# Patient Record
Sex: FEMALE | Race: WHITE | NOT HISPANIC OR LATINO | URBAN - METROPOLITAN AREA
[De-identification: names, ages, dates, MRNs, and addresses within clinical notes are randomized per-mention and may not be internally consistent; named-entity substitution may affect disease eponyms.]

---

## 2023-05-28 ENCOUNTER — INPATIENT (INPATIENT)
Facility: HOSPITAL | Age: 33
LOS: 1 days | Discharge: HOME | End: 2023-05-30
Attending: OBSTETRICS & GYNECOLOGY | Admitting: OBSTETRICS & GYNECOLOGY
Payer: COMMERCIAL

## 2023-05-28 VITALS
HEIGHT: 65 IN | SYSTOLIC BLOOD PRESSURE: 129 MMHG | WEIGHT: 210.1 LBS | TEMPERATURE: 97 F | HEART RATE: 71 BPM | RESPIRATION RATE: 14 BRPM | DIASTOLIC BLOOD PRESSURE: 82 MMHG

## 2023-05-28 DIAGNOSIS — Z98.891 HISTORY OF UTERINE SCAR FROM PREVIOUS SURGERY: Chronic | ICD-10-CM

## 2023-05-28 DIAGNOSIS — Z3A.00 WEEKS OF GESTATION OF PREGNANCY NOT SPECIFIED: ICD-10-CM

## 2023-05-28 DIAGNOSIS — O26.893 OTHER SPECIFIED PREGNANCY RELATED CONDITIONS, THIRD TRIMESTER: ICD-10-CM

## 2023-05-28 DIAGNOSIS — O26.899 OTHER SPECIFIED PREGNANCY RELATED CONDITIONS, UNSPECIFIED TRIMESTER: ICD-10-CM

## 2023-05-28 PROCEDURE — 59050 FETAL MONITOR W/REPORT: CPT

## 2023-05-28 PROCEDURE — 86850 RBC ANTIBODY SCREEN: CPT

## 2023-05-28 PROCEDURE — 85610 PROTHROMBIN TIME: CPT

## 2023-05-28 PROCEDURE — 86703 HIV-1/HIV-2 1 RESULT ANTBDY: CPT

## 2023-05-28 PROCEDURE — 86901 BLOOD TYPING SEROLOGIC RH(D): CPT

## 2023-05-28 PROCEDURE — 85384 FIBRINOGEN ACTIVITY: CPT

## 2023-05-28 PROCEDURE — 36415 COLL VENOUS BLD VENIPUNCTURE: CPT

## 2023-05-28 PROCEDURE — 86900 BLOOD TYPING SEROLOGIC ABO: CPT

## 2023-05-28 PROCEDURE — 86592 SYPHILIS TEST NON-TREP QUAL: CPT

## 2023-05-28 PROCEDURE — 85025 COMPLETE CBC W/AUTO DIFF WBC: CPT

## 2023-05-28 PROCEDURE — 85730 THROMBOPLASTIN TIME PARTIAL: CPT

## 2023-05-28 RX ORDER — SODIUM CHLORIDE 9 MG/ML
1000 INJECTION, SOLUTION INTRAVENOUS
Refills: 0 | Status: DISCONTINUED | OUTPATIENT
Start: 2023-05-28 | End: 2023-05-29

## 2023-05-28 RX ORDER — CITRIC ACID/SODIUM CITRATE 300-500 MG
15 SOLUTION, ORAL ORAL EVERY 6 HOURS
Refills: 0 | Status: DISCONTINUED | OUTPATIENT
Start: 2023-05-28 | End: 2023-05-29

## 2023-05-28 RX ORDER — OXYTOCIN 10 UNIT/ML
333.33 VIAL (ML) INJECTION
Qty: 20 | Refills: 0 | Status: DISCONTINUED | OUTPATIENT
Start: 2023-05-28 | End: 2023-05-29

## 2023-05-28 NOTE — OB PROVIDER H&P - NSHPPHYSICALEXAM_GEN_ALL_CORE
Vital Signs Last 24 Hrs  T(C): 36.3 (28 May 2023 23:35), Max: 36.3 (28 May 2023 23:35)  T(F): 97.4 (28 May 2023 23:35), Max: 97.4 (28 May 2023 23:35)  HR: 71 (28 May 2023 23:35) (71 - 71)  BP: 129/82 (28 May 2023 23:35) (129/82 - 129/82)  RR: 14 (28 May 2023 23:35) (14 - 14)    Gen: NAD, sitting comfortably  Abd: Gravid, soft, NT, moderatly palpable ctx, no incisional tenderness  SVE: 2/80/-2, vtx, intact  EFM: 145/mod/+accels  [place]: Q2-3m Vital Signs Last 24 Hrs  T(C): 36.3 (28 May 2023 23:35), Max: 36.3 (28 May 2023 23:35)  T(F): 97.4 (28 May 2023 23:35), Max: 97.4 (28 May 2023 23:35)  HR: 71 (28 May 2023 23:35) (71 - 71)  BP: 129/82 (28 May 2023 23:35) (129/82 - 129/82)  RR: 14 (28 May 2023 23:35) (14 - 14)    Gen: NAD, sitting comfortably  Abd: Gravid, soft, NT, moderatly palpable ctx, no incisional tenderness  SVE: 2/80/-2, vtx, intact  EFM: 145/mod/+accels/+ decel  [place]: Q2-3m

## 2023-05-28 NOTE — OB PROVIDER H&P - HISTORY OF PRESENT ILLNESS
32 yo  @39w1d, MELL 6/3/23, presents for contractions. Pt reports strong contractions that started this evening, now q2-3m, 10/10 intensity. Reports +FM, denies VB. Unsure of LOF. Pregnancy complicated by h/o DVT in prior pregnancy at 20 weeks. This pregnancy, on lovenox, transitioned to Heparin 5k BID. Last dose 8am. H/o LTCS due to arrest at 7cm, 8-11lb . Desires TOLAC. GBS negative per PMD.  32 yo  @39w1d, MELL 6/3/23, presents for contractions. Pt reports strong contractions that started this evening, now q2-3m, 10/10 intensity. Reports +FM, denies VB. Unsure of LOF. Pregnancy complicated by h/o DVT in prior pregnancy at 20 weeks. This pregnancy, on lovenox, transitioned to Heparin 5k BID. Last dose 8am. H/o LTCS due to arrest at 7cm, 8-11lb . Desires TOLAC. GBS negative.

## 2023-05-28 NOTE — OB PROVIDER H&P - NSOBPROC_OBGYN_ALL_OB
9/26/2018         RE: Devyn Link  [address] [address] [facility] [address] [id number]        Dear Colleague,    Thank you for referring your patient, Devyn Link, to the [facility]. Please see a copy of my visit note below.    Name: Devyn Link  Seen in consultation with Prisca Lucero for hyperPTH.  HPI:  Devyn Link is a 75 year old female who presents for the evaluation of hyperPTH.  High calcium has been noted since 2007 on and off.  Other past medical history include CAD, history of aortic valve replacement, hypertension, mitral valve replacement, pacemaker in place, prediabetes, osteoporosis without current pathological fracture.  Was on amiodarone in past and now off X 6 months. Was on it for about 2-3 years.  DEXA 2017 consistent with osteoporosis.  She is not on medication at this time.    History of Cancer:No  Thiazide Diuretic:No  Lithium use:No  Family History of pituitary adenoma, pancreas tumors, Zollinger-Diaz syndrome, pheochromocytoma. No  Kidney stones:No  Fractures: No  Abdominal Pain:Yes (Please explain): sometimes. H/o peptic ulcer. Is followed up by GI.  Cramps: No  Constipation: sometimes  Muscle Aches or pains: No  Muscle twitches: No  Nausea and vomiting: sometimes  Confusion Lethargy and fatigue: better now  ON medications like Lithium/ HCTZ: no  Average Daily Calcium intake: dairy- not much  Ca and Vit D supplementation: no    PMH/PSH:  Past Medical History:   Diagnosis Date     Hypertension      Myocardial infarction      Pacemaker      Past Surgical History:   Procedure Laterality Date     C REPLACEMENT OF MITRAL VALVE  1994    Done at [facility]     COLONOSCOPY  06/13/2018    Dr. Alvarez [facility]     COLONOSCOPY N/A 6/13/2018    Procedure: COLONOSCOPY;;  Surgeon: Rosario Alvarez MD;  Location:  GI     ESOPHAGOSCOPY, GASTROSCOPY, DUODENOSCOPY (EGD), COMBINED  06/13/2018    Dr. Kim KEITH     ESOPHAGOSCOPY, GASTROSCOPY, DUODENOSCOPY (EGD), COMBINED N/A 6/13/2018     "Procedure: COMBINED ESOPHAGOSCOPY, GASTROSCOPY, DUODENOSCOPY (EGD), BIOPSY SINGLE OR MULTIPLE;  COMBINED ESOPHAGOSCOPY, GASTROSCOPY, DUODENOSCOPY (EGD) with biopsies COLONOSCOPY   ;  Surgeon: Rosario Alvarez MD;  Location:  GI     HEART CATH DRUG ELUTING STENT PLACEMENT  2004/2007    seeing Cardiologist at [facility]     SURGICAL HISTORY OF -   ~ 1995    mechanical aortic valve     SURGICAL HISTORY OF -       pacemaker     SURGICAL HISTORY OF -       mechanical mitral valve     Family Hx:  Family History   Problem Relation Age of Onset     Hypertension Mother      Cancer Mother      lung     HEART DISEASE Mother      Cerebrovascular Disease Mother      Hypertension Sister      Colon Cancer No family hx of             Social Hx:  Social History     Social History     Marital status:      Spouse name: N/A     Number of children: N/A     Years of education: N/A     Occupational History     Not on file.     Social History Main Topics     Smoking status: Never Smoker     Smokeless tobacco: Never Used     Alcohol use No     Drug use: No     Sexual activity: Yes     Partners: Male     Other Topics Concern     Parent/Sibling W/ Cabg, Mi Or Angioplasty Before 65f 55m? Yes     Social History Narrative          MEDICATIONS:  has a current medication list which includes the following prescription(s): aspirin, aspirin not prescribed, atorvastatin, carvedilol, ferrous sulfate, furosemide, losartan, nitroglycerin, pantoprazole, and warfarin.    ROS     ROS: 10 point ROS neg other than the symptoms noted above in the HPI.    Physical Exam   VS: LMP  (LMP Unknown)   /72 (BP Location: Left arm, Patient Position: Chair, Cuff Size: Adult Regular)  Pulse 77  Temp 97.4  F (36.3  C) (Oral)  Ht 1.657 m (5' 5.25\")  Wt 51.1 kg (112 lb 9.6 oz)  LMP  (LMP Unknown)  SpO2 99%  Breastfeeding? No  BMI 18.59 kg/m2    GENERAL: AXOX3, NAD, well dressed, answering questions appropriately, appears stated age.  HEENT: OP " clear, no LAD, no TM, non-tender, no exopthalmous, no proptosis, EOMI, no lig lag, no retraction  NECK: Thyroid normal in size, non tender, no nodules were palpated  CV: RRR, no rubs, gallops, no murmurs  LUNGS: CTAB, no wheezes, rales, or ronchi  ABDOMEN: +BS  EXTREMITIES: no edema, +pulses, no rashes, no lesions  NEUROLOGY: CN grossly intact, + DTR upper and lower extremity, no tremors  MSK: grossly intact  SKIN: no rashes, no lesions  PSYCH: normal affect and mood    LABS:  Calcium:  ENDO CALCIUM LABS-UMP Latest Ref Rng & Units 9/10/2018 8/17/2018   CALCIUM 8.5 - 10.1 mg/dL 11.0 (H) 9.8     ENDO CALCIUM LABS-UMP Latest Ref Rng & Units 6/22/2018 6/14/2018   CALCIUM 8.5 - 10.1 mg/dL 10.2 (H)      ENDO CALCIUM LABS-UMP Latest Ref Rng & Units 6/5/2018   CALCIUM 8.5 - 10.1 mg/dL 10.6 (H)       PTH:  ENDO CALCIUM LABS-UMP Latest Ref Rng & Units 6/22/2018   PARATHYROID HORMONE INTACT 18 - 80 pg/mL 97 (H)     Vitamin D:  Lab Results   Component Value Date    VITDT 27 06/22/2018       TFTs:  Lab Results   Component Value Date    TSH 0.87 04/02/2018     DEXA 10/2017:  REFERENCE T-SCORES:       Normal                -1.0 and greater                                 Osteopenia         Between -1.0 and -2.5                                           Osteoporosis     -2.5 and less                                       RISK FACTORS:  Post-menopausal, Height loss of 1.75 inches, Follow-up osteopenia, Low body weight     CURRENT TREATMENT:  Calcium, Vitamin D      FINDINGS:               Lumbar Spine (L1-L2; due to significant degenerative changes at L3 and L4, these were omitted)      T-score:  -3.3               Left Femoral Neck                      T-score:  -2.9               Right Femoral Neck                    T-score:  -3.0                             Lumbar (L1-L4) BMD: 0.876                                                           Total Hip Mean BMD: 0.663                    LATERAL VERTEBRAL ASSESSMENT  Procedure:   Vertebral fracture assessment was performed in the lateral decubitus position using a PROSimity Prodigy  densitometer.  Indications for VFA: T-score of -1.0 or worse and age (female>69)  Confounding factors for VFA: Arthritis/degenerative disc disease, rib shadows.  The LVA scan is interpretable from T7 to L4.     VFA Findings: Using the semi-quantitative analysis of [employer] there was evidence of no spinal deformity  VFA Impression: Devyn Link has no vertebral fractures identified on the VFA.          IMPRESSION  Osteoporosis  Degenerative changes of the spine  Recommendations include ensuring adequate daily Calcium and Vitamin D intake  Follow up scan can be considered in three years.     Patient had a study performed previously, however the prior images are not available to compare to the current study.     Current NOF guidelines recommend treatment for patients with the following:  - Prior hip or vertebral fracture  - T-score -2.5 or below  - A 10 year risk of any major osteoporotic fracture >20% or 10 year risk of hip fracture >3%, as calculated using the FRAX calculator (www.shef.ac.uk/FRAX).       Based on these guidelines, treatment (in addition to calcium and vitamin D) is recommended for this patient, after ruling out other causes of osteoporosis/low bone density.  While this is meant as an aid to clinical decision-making, clinical judgment must still be used.     All pertinent notes, labs, and images personally reviewed by me.     A/P  Ms.Xiang Link is a 75 year old here for the evaluation of:    1. Hyperparathyroidism:  In the differential diagnosis of hypercalcemia, primary hyperparathyroidism is the most common cause. It is important to distinguish readily between primary hyperparathyroidism and other causes of hypercalcemia.    High calcium levels noted on and off since 2007.  More consistently high calcium level since 2017  Slightly elevated parathyroid hormone  Kidney function is in normal range  Vitamin D  was in low normal range  Not on thaizide diuretic or lithium.  History of osteoporosis based on DEXA scan done 2017.  She is not on medication at this time  History of peptic ulcer.  Followed by GI  Plan:  Discussed diagnosis, pathophysiology, management and treatment options of condition with pt.  Labs as below  I would also get gastrin levels given her history of peptic ulcer and concern for MEN 1 syndrome  Sestamibi scan as noted below  Follow-up after above.  Plan: Magnesium, Parathyroid Hormone Intact,         Phosphorus, Vitamin D Deficiency, Creatinine,         Calcium, Calcium ionized, Protein         electrophoresis, Protein electrophoresis, timed        urine, PTH Related Peptide Test, NM Parathyroid        Planar w/Spect & CT Dual, Gastrin         2.  Osteoporosis:  DEXA 2017 c/w osteoporosis  Not on medication at this time  Labs work as noted above  Consider medication options after that.  I also encouraged her to discuss medication options with primary care provider Dr. Lucero.  The pt was advised to    Maintain an adequate calcium and vitamin D intake and to supplement vitamin D if needed to maintain serum levels of 25 hydroxy D (25 OH D) between 30-60 ng/ml.    Limit alcohol intake to no more than 2 servings per day.    Limit caffeine intake.    Maintain an active lifestyle including weight-bearing exercises for at least 30 mins daily.    Take measures to reduce the risk of falling.      HEREDITARY HYPERPARATHYROID STATES  Multiple Endocrine Neoplasia (MEN), both type 1 and type II.  Primary hyperparathyroidism is often the first and is the most common of the endocrinopathies in MEN1, reaching nearly 100% penetrance by the age of 50. In MEN I, the other tumors, besides the parathyroids, that can develop are those of pancreas and anterior pituitary glands. Involvement of two of the three glands confirms the presence of MEN I. MEN IIa, in which hyperparathyroidism can be associated with medullary thyroid  cancer.    Hyperparathyroidism jaw tumor syndrome (AD), is associated with PHPT and fibromas in the mandible or the maxilla. Unlike FHH or the MEN I and II, parathyroid carcinoma is more common in hyperparathyroidism jaw tumor syndrome.     Familial hypocalciuric hypercalcemia (FHH). This presentation that can be confused with the most common form of primary hyperparathyroidism, namely the sporadic isolated disorder. FHH is also known as Familial Benign Hypercalcemia because it is generally asymptomatic and does not require treatment.  Lab work to differentiate FHH from primary PTH Hypercalcemia, Hypocalciuria, Normal to high levels of PTH, Hypermagnesemia, Calcium/creatinine clearance ratio <0.01, and Genetic testing for mutations in CASR.     The serum calcium determination is typically not greater than 1 mg/dl above the upper limits of normal. The serum phosphorus is in the lower range of normal with only approximately 25% of patients showing phosphorus levels that are frankly low. Total alkaline phosphatase activity is in the high normal range as is the case also for more specific markers of bone turnover and, bone-specific alkaline phosphatase activity. If the normal concentration of 25-hydroxyvitamin D level is taken to be >30 ng/ml, then most patients with primary hyperparathyroidism will have low levels. In contrast, the 1,25-dihydroxyvitamin D level tends to be in the upper range of normal and, in fact, frankly elevated in 25% of patients with primary hyperparathyroidism.    Guidelines for parathyroid surgery in asymptomatic primary hyperparathyroidism:    Serum Calcium >1.0 mg/dl (0.25 mmol/L) above normal   Creatinine Clearance (calculated) Below 60m1/min /1.73 m2)   Bone Mineral Density T score < -2.5 SD at spine, hip (total or femoral neck) or radius (distal 1/3 site) or presence of fragility fracture   Age Age < 50 years        PARATHYROID GLAND IMAGING  Pre- operative imaging is of value prior to  surgery in the localization of abnormal parathyroid tissue . The diagnosis of PHPT is based on the biochemical findings and is not affected by the results of the imaging studies. Sestamibi imaging is of value in localizing abnormal parathyroid tissue. The sensitivity and specificity varies among institutions.    Today we will obtain: BMP,Ca, [place]son, Phosphorus, PTH, albumin, Vitamin D (25 and 1-25), 24hour urine calcium, Sestamibi, and DEXA. Can consider Abdominal Xray for Kidney stones.      More than 50% of the time spent with Ms. Link on counseling / coordinating her care.     Follow-up:  After above.    Keisha Castro MD  Endocrinology   [facility]  September 26, 2018  CC: Prisca Lucero      Addendum to above note and clinic visit:    Labs reviewed.    See result note/telephone encounter.            Again, thank you for allowing me to participate in the care of your patient.        Sincerely,        Keisha Castro MD     None Done

## 2023-05-28 NOTE — OB PROVIDER H&P - ASSESSMENT
A/P: 32 yo G4 A/P: 32 yo  @39w2d, GBS neg, h/o DVT on Heparin, h/o LTCS x1, with contractions, in early labor requesting epidural, desiring TOLAC  -Admit to L+D  -Monitor EFM and TOCO   -IVF and labs, coags  -Pain control PRN  -Clear liquid diet as tolerated  -pt demonstrated understanding risks/alternatives/benefits to repeat  vs. TOLAC for  and desires to TOLAC  -anesthesia notified for epidural  -will need anticoagulation PP  -Motrin allergy    Dr. Sears and Dr. Lowe aware

## 2023-05-28 NOTE — OB PROVIDER H&P - NS_OBGYNHISTORY_OBGYN_ALL_OB_FT
OB Hx;   FT LTCS x1, arrest @7cm, 8-11lb. DVT in pregnancy      GYN Hx; Denies h/o fibroids, cysts, abnormal pap, STI OB Hx;   FT LTCS x1, arrest @7cm, 8-11lb. DVT in pregnancy  TOP x2, no D&C    GYN Hx; Denies h/o fibroids, cysts, abnormal pap, STI

## 2023-05-28 NOTE — OB RN PATIENT PROFILE - FUNCTIONAL ASSESSMENT - DAILY ACTIVITY 2.
Rigid and flexible ureteroscopic laser lithotripsy with stent placement under gen anes by Naty Portillo MD
4 = No assist / stand by assistance

## 2023-05-28 NOTE — OB RN PATIENT PROFILE - FALL HARM RISK - UNIVERSAL INTERVENTIONS
Bed in lowest position, wheels locked, appropriate side rails in place/Call bell, personal items and telephone in reach/Instruct patient to call for assistance before getting out of bed or chair/Non-slip footwear when patient is out of bed/[place] to call system/Physically safe environment - no spills, clutter or unnecessary equipment/Purposeful Proactive Rounding/Room/bathroom lighting operational, light cord in reach

## 2023-05-29 LAB
APTT BLD: 26.7 SEC — LOW (ref 27–39.2)
BASOPHILS # BLD AUTO: 0.01 K/UL — SIGNIFICANT CHANGE UP (ref 0–0.2)
BASOPHILS # BLD AUTO: 0.02 K/UL — SIGNIFICANT CHANGE UP (ref 0–0.2)
BASOPHILS NFR BLD AUTO: 0.1 % — SIGNIFICANT CHANGE UP (ref 0–1)
BASOPHILS NFR BLD AUTO: 0.1 % — SIGNIFICANT CHANGE UP (ref 0–1)
BLD GP AB SCN SERPL QL: SIGNIFICANT CHANGE UP
EOSINOPHIL # BLD AUTO: 0.01 K/UL — SIGNIFICANT CHANGE UP (ref 0–0.7)
EOSINOPHIL # BLD AUTO: 0.08 K/UL — SIGNIFICANT CHANGE UP (ref 0–0.7)
EOSINOPHIL NFR BLD AUTO: 0.1 % — SIGNIFICANT CHANGE UP (ref 0–8)
EOSINOPHIL NFR BLD AUTO: 0.8 % — SIGNIFICANT CHANGE UP (ref 0–8)
FIBRINOGEN PPP-MCNC: >700 MG/DL — HIGH (ref 204.4–570.6)
HCT VFR BLD CALC: 32.9 % — LOW (ref 37–47)
HCT VFR BLD CALC: 33.6 % — LOW (ref 37–47)
HGB BLD-MCNC: 10.4 G/DL — LOW (ref 12–16)
HGB BLD-MCNC: 11.2 G/DL — LOW (ref 12–16)
HIV 1 & 2 AB SERPL IA.RAPID: SIGNIFICANT CHANGE UP
IMM GRANULOCYTES NFR BLD AUTO: 0.8 % — HIGH (ref 0.1–0.3)
IMM GRANULOCYTES NFR BLD AUTO: 1.3 % — HIGH (ref 0.1–0.3)
INR BLD: 0.82 RATIO — SIGNIFICANT CHANGE UP (ref 0.65–1.3)
LYMPHOCYTES # BLD AUTO: 1.42 K/UL — SIGNIFICANT CHANGE UP (ref 1.2–3.4)
LYMPHOCYTES # BLD AUTO: 1.67 K/UL — SIGNIFICANT CHANGE UP (ref 1.2–3.4)
LYMPHOCYTES # BLD AUTO: 16.5 % — LOW (ref 20.5–51.1)
LYMPHOCYTES # BLD AUTO: 9 % — LOW (ref 20.5–51.1)
MCHC RBC-ENTMCNC: 27.7 PG — SIGNIFICANT CHANGE UP (ref 27–31)
MCHC RBC-ENTMCNC: 28.6 PG — SIGNIFICANT CHANGE UP (ref 27–31)
MCHC RBC-ENTMCNC: 31.6 G/DL — LOW (ref 32–37)
MCHC RBC-ENTMCNC: 33.3 G/DL — SIGNIFICANT CHANGE UP (ref 32–37)
MCV RBC AUTO: 85.7 FL — SIGNIFICANT CHANGE UP (ref 81–99)
MCV RBC AUTO: 87.5 FL — SIGNIFICANT CHANGE UP (ref 81–99)
MONOCYTES # BLD AUTO: 0.63 K/UL — HIGH (ref 0.1–0.6)
MONOCYTES # BLD AUTO: 0.99 K/UL — HIGH (ref 0.1–0.6)
MONOCYTES NFR BLD AUTO: 6.2 % — SIGNIFICANT CHANGE UP (ref 1.7–9.3)
MONOCYTES NFR BLD AUTO: 6.3 % — SIGNIFICANT CHANGE UP (ref 1.7–9.3)
NEUTROPHILS # BLD AUTO: 13.21 K/UL — HIGH (ref 1.4–6.5)
NEUTROPHILS # BLD AUTO: 7.59 K/UL — HIGH (ref 1.4–6.5)
NEUTROPHILS NFR BLD AUTO: 75.1 % — SIGNIFICANT CHANGE UP (ref 42.2–75.2)
NEUTROPHILS NFR BLD AUTO: 83.7 % — HIGH (ref 42.2–75.2)
NRBC # BLD: 0 /100 WBCS — SIGNIFICANT CHANGE UP (ref 0–0)
NRBC # BLD: 0 /100 WBCS — SIGNIFICANT CHANGE UP (ref 0–0)
PLATELET # BLD AUTO: 220 K/UL — SIGNIFICANT CHANGE UP (ref 130–400)
PLATELET # BLD AUTO: 231 K/UL — SIGNIFICANT CHANGE UP (ref 130–400)
PMV BLD: 11.8 FL — HIGH (ref 7.4–10.4)
PMV BLD: 12.1 FL — HIGH (ref 7.4–10.4)
PRENATAL SYPHILIS TEST: SIGNIFICANT CHANGE UP
PROTHROM AB SERPL-ACNC: 9.3 SEC — LOW (ref 9.95–12.87)
RBC # BLD: 3.76 M/UL — LOW (ref 4.2–5.4)
RBC # BLD: 3.92 M/UL — LOW (ref 4.2–5.4)
RBC # FLD: 13.2 % — SIGNIFICANT CHANGE UP (ref 11.5–14.5)
RBC # FLD: 13.2 % — SIGNIFICANT CHANGE UP (ref 11.5–14.5)
WBC # BLD: 10.11 K/UL — SIGNIFICANT CHANGE UP (ref 4.8–10.8)
WBC # BLD: 15.78 K/UL — HIGH (ref 4.8–10.8)
WBC # FLD AUTO: 10.11 K/UL — SIGNIFICANT CHANGE UP (ref 4.8–10.8)
WBC # FLD AUTO: 15.78 K/UL — HIGH (ref 4.8–10.8)

## 2023-05-29 RX ORDER — TETANUS TOXOID, REDUCED DIPHTHERIA TOXOID AND ACELLULAR PERTUSSIS VACCINE, ADSORBED 5; 2.5; 8; 8; 2.5 [IU]/.5ML; [IU]/.5ML; UG/.5ML; UG/.5ML; UG/.5ML
0.5 SUSPENSION INTRAMUSCULAR ONCE
Refills: 0 | Status: DISCONTINUED | OUTPATIENT
Start: 2023-05-29 | End: 2023-05-30

## 2023-05-29 RX ORDER — ENOXAPARIN SODIUM 100 MG/ML
40 INJECTION SUBCUTANEOUS EVERY 24 HOURS
Refills: 0 | Status: DISCONTINUED | OUTPATIENT
Start: 2023-05-29 | End: 2023-05-30

## 2023-05-29 RX ORDER — BENZOCAINE 10 %
1 GEL (GRAM) MUCOUS MEMBRANE EVERY 6 HOURS
Refills: 0 | Status: DISCONTINUED | OUTPATIENT
Start: 2023-05-29 | End: 2023-05-30

## 2023-05-29 RX ORDER — SIMETHICONE 80 MG/1
80 TABLET, CHEWABLE ORAL EVERY 4 HOURS
Refills: 0 | Status: DISCONTINUED | OUTPATIENT
Start: 2023-05-29 | End: 2023-05-30

## 2023-05-29 RX ORDER — SODIUM CHLORIDE 9 MG/ML
3 INJECTION INTRAMUSCULAR; INTRAVENOUS; SUBCUTANEOUS EVERY 8 HOURS
Refills: 0 | Status: DISCONTINUED | OUTPATIENT
Start: 2023-05-29 | End: 2023-05-30

## 2023-05-29 RX ORDER — OXYTOCIN 10 UNIT/ML
41.67 VIAL (ML) INJECTION
Qty: 20 | Refills: 0 | Status: DISCONTINUED | OUTPATIENT
Start: 2023-05-29 | End: 2023-05-30

## 2023-05-29 RX ORDER — MAGNESIUM HYDROXIDE 400 MG/1
30 TABLET, CHEWABLE ORAL
Refills: 0 | Status: DISCONTINUED | OUTPATIENT
Start: 2023-05-29 | End: 2023-05-30

## 2023-05-29 RX ORDER — DIBUCAINE 1 %
1 OINTMENT (GRAM) RECTAL EVERY 6 HOURS
Refills: 0 | Status: DISCONTINUED | OUTPATIENT
Start: 2023-05-29 | End: 2023-05-30

## 2023-05-29 RX ORDER — PRAMOXINE HYDROCHLORIDE 150 MG/15G
1 AEROSOL, FOAM RECTAL EVERY 4 HOURS
Refills: 0 | Status: DISCONTINUED | OUTPATIENT
Start: 2023-05-29 | End: 2023-05-30

## 2023-05-29 RX ORDER — LANOLIN
1 OINTMENT (GRAM) TOPICAL EVERY 6 HOURS
Refills: 0 | Status: DISCONTINUED | OUTPATIENT
Start: 2023-05-29 | End: 2023-05-30

## 2023-05-29 RX ORDER — OXYCODONE HYDROCHLORIDE 5 MG/1
5 TABLET ORAL ONCE
Refills: 0 | Status: DISCONTINUED | OUTPATIENT
Start: 2023-05-29 | End: 2023-05-30

## 2023-05-29 RX ORDER — AER TRAVELER 0.5 G/1
1 SOLUTION RECTAL; TOPICAL EVERY 4 HOURS
Refills: 0 | Status: DISCONTINUED | OUTPATIENT
Start: 2023-05-29 | End: 2023-05-30

## 2023-05-29 RX ORDER — HYDROCORTISONE 1 %
1 OINTMENT (GRAM) TOPICAL EVERY 6 HOURS
Refills: 0 | Status: DISCONTINUED | OUTPATIENT
Start: 2023-05-29 | End: 2023-05-30

## 2023-05-29 RX ORDER — ENOXAPARIN SODIUM 100 MG/ML
40 INJECTION SUBCUTANEOUS
Qty: 42 | Refills: 0
Start: 2023-05-29 | End: 2023-07-09

## 2023-05-29 RX ORDER — DIPHENHYDRAMINE HCL 50 MG
25 CAPSULE ORAL EVERY 6 HOURS
Refills: 0 | Status: DISCONTINUED | OUTPATIENT
Start: 2023-05-29 | End: 2023-05-30

## 2023-05-29 RX ORDER — OXYCODONE HYDROCHLORIDE 5 MG/1
5 TABLET ORAL
Refills: 0 | Status: DISCONTINUED | OUTPATIENT
Start: 2023-05-29 | End: 2023-05-30

## 2023-05-29 RX ORDER — ACETAMINOPHEN 500 MG
975 TABLET ORAL
Refills: 0 | Status: DISCONTINUED | OUTPATIENT
Start: 2023-05-29 | End: 2023-05-30

## 2023-05-29 RX ADMIN — SODIUM CHLORIDE 3 MILLILITER(S): 9 INJECTION INTRAMUSCULAR; INTRAVENOUS; SUBCUTANEOUS at 15:03

## 2023-05-29 RX ADMIN — SODIUM CHLORIDE 3 MILLILITER(S): 9 INJECTION INTRAMUSCULAR; INTRAVENOUS; SUBCUTANEOUS at 23:46

## 2023-05-29 RX ADMIN — Medication 975 MILLIGRAM(S): at 23:07

## 2023-05-29 RX ADMIN — Medication 975 MILLIGRAM(S): at 23:47

## 2023-05-29 RX ADMIN — Medication 975 MILLIGRAM(S): at 10:00

## 2023-05-29 RX ADMIN — Medication 975 MILLIGRAM(S): at 09:28

## 2023-05-29 RX ADMIN — ENOXAPARIN SODIUM 40 MILLIGRAM(S): 100 INJECTION SUBCUTANEOUS at 14:32

## 2023-05-29 RX ADMIN — SODIUM CHLORIDE 125 MILLILITER(S): 9 INJECTION, SOLUTION INTRAVENOUS at 01:36

## 2023-05-29 RX ADMIN — Medication 975 MILLIGRAM(S): at 17:40

## 2023-05-29 RX ADMIN — Medication 975 MILLIGRAM(S): at 17:08

## 2023-05-29 RX ADMIN — Medication 1 TABLET(S): at 11:34

## 2023-05-29 NOTE — CHART NOTE - NSCHARTNOTEFT_GEN_A_CORE
PGY 1 note    Pt seen at bedside following epi for reported rectal pressure.  SVE 10/100/+1. Will begin to push shortly    Dr. Sears and Dr. Lowe aware

## 2023-05-29 NOTE — PROCEDURE NOTE - NSLABRESULTS_OBGYN_ALL_OB_FT
11.2   10.11 )-----------( 220      ( 29 May 2023 00:00 )             33.6   PT/INR - ( 29 May 2023 00:00 )   PT: 9.30 sec;   INR: 0.82 ratio         PTT - ( 29 May 2023 00:00 )  PTT:26.7 sec

## 2023-05-29 NOTE — OB PROVIDER DELIVERY SUMMARY - NSSELHIDDEN_OBGYN_ALL_OB_FT
[NS_[email or web address]_ALL_OB_FT:[id number]],[NS_DeliveryRN_OBGYN_ALL_OB_FT:[id number]=],[NS_DeliveryAttending1_OBGYN_ALL_OB_FT:[email or web address]=]

## 2023-05-29 NOTE — OB PROVIDER DELIVERY SUMMARY - NSPROVIDERDELIVERYNOTE_OBGYN_ALL_OB_FT
Patient was fully dilated and pushed. Fetal head delivered OA, and restituted to LOT. The anterior and  posterior shoulders delivered, followed by the remaining body atraumatically. Delayed cord clamping was performed for 1 minute, and then was clamped and cut. Cord blood & gases collected. The  was handed to mother for skin to skin. The placenta delivered spontaneously intact with 3v cord. Uterus massaged and firm with oxytocin given IV, patient stable. Cervix, vagina and perineum inspected. Repair of 1st degree laceration repaired in the usual fashion with 2-0 chromic. QBL XX, hemostasis assured, uterus firm, patient in stable condition.     live female, APGARS 9/9 delivered. Patient was fully dilated and pushed. Fetal head delivered OA, and restituted to LOT. The anterior and  posterior shoulders delivered, followed by the remaining body atraumatically. Delayed cord clamping was performed for 1 minute, and then was clamped and cut. Cord blood & gases collected. The  was handed to mother for skin to skin. The placenta delivered spontaneously intact with 3v cord. Uterus massaged and firm with oxytocin given IV, patient stable. Cervix, vagina and perineum inspected. Repair of 1st degree laceration repaired in the usual fashion with 2-0 chromic. , hemostasis assured, uterus firm, patient in stable condition.     live female, APGARS 9/9 delivered.

## 2023-05-29 NOTE — PROCEDURE NOTE - ADDITIONAL PROCEDURE DETAILS
Procedure explained including risks, benefits alternatives discussed. Written consent obtained.  @L4L5 interspace 17 gauge touhy inserted   -CARLO at 5cm no CSF no heme.   -aspration negative  -test dose negative  -Epid cath placed and secured at 10cm    Pt tolerated procedure well and expresses relief of pain.  Nurse and Pt instructed to contact Anesthesia with questions or concerns

## 2023-05-29 NOTE — PROCEDURAL SAFETY CHECKLIST WITH OR WITHOUT SEDATION - NSPRESURGSED_GEN_ALL_CORE
----- Message from Jt Corona MD sent at 1/27/2023  4:05 PM CST -----  EKG shows sinus rhythm with frequent PVCs.  Please refer to Dr. Garcia cardiology for further evaluation and management.    Unable to LM as VM is full. Will call at a later time.    Attempted to call home phone- number has been disconnected.     Unable to LM as VM is full. 2nd attempt.    Letter sent to call to discuss test results. EKG.      
n/a

## 2023-05-29 NOTE — OB RN DELIVERY SUMMARY - NSSELHIDDEN_OBGYN_ALL_OB_FT
[NS_DeliveryAssist1_OBGYN_ALL_OB_FT:[id number]],[NS_DeliveryRN_OBGYN_ALL_OB_FT:[id number]=],[NS_DeliveryAttending1_OBGYN_ALL_OB_FT:[email or web address]=] no

## 2023-05-30 ENCOUNTER — TRANSCRIPTION ENCOUNTER (OUTPATIENT)
Age: 33
End: 2023-05-30

## 2023-05-30 VITALS
TEMPERATURE: 98 F | DIASTOLIC BLOOD PRESSURE: 71 MMHG | SYSTOLIC BLOOD PRESSURE: 115 MMHG | HEART RATE: 79 BPM | RESPIRATION RATE: 18 BRPM

## 2023-05-30 RX ORDER — AER TRAVELER 0.5 G/1
1 SOLUTION RECTAL; TOPICAL
Qty: 0 | Refills: 0 | DISCHARGE
Start: 2023-05-30

## 2023-05-30 RX ORDER — BENZOCAINE 10 %
1 GEL (GRAM) MUCOUS MEMBRANE
Qty: 0 | Refills: 0 | DISCHARGE
Start: 2023-05-30

## 2023-05-30 RX ORDER — ACETAMINOPHEN 500 MG
3 TABLET ORAL
Qty: 0 | Refills: 0 | DISCHARGE
Start: 2023-05-30

## 2023-05-30 RX ADMIN — Medication 975 MILLIGRAM(S): at 06:52

## 2023-05-30 RX ADMIN — SODIUM CHLORIDE 3 MILLILITER(S): 9 INJECTION INTRAMUSCULAR; INTRAVENOUS; SUBCUTANEOUS at 06:11

## 2023-05-30 RX ADMIN — Medication 975 MILLIGRAM(S): at 06:20

## 2023-05-30 RX ADMIN — Medication 1 TABLET(S): at 12:17

## 2023-05-30 RX ADMIN — Medication 975 MILLIGRAM(S): at 12:17

## 2023-05-30 RX ADMIN — ENOXAPARIN SODIUM 40 MILLIGRAM(S): 100 INJECTION SUBCUTANEOUS at 14:43

## 2023-05-30 NOTE — DISCHARGE NOTE OB - MEDICATION SUMMARY - MEDICATIONS TO TAKE
I will START or STAY ON the medications listed below when I get home from the hospital:    acetaminophen 325 mg oral tablet  -- 3 tab(s) by mouth every 6 hours  -- Indication: For pain    benzocaine 20% topical spray  -- 1 Apply on skin to affected area every 6 hours As needed for Perineal discomfort  -- Indication: For perineal discomfort    witch hazel 50% topical pad  -- 1 Apply on skin to affected area every 4 hours As needed Perineal discomfort  -- Indication: For perineal discomfort    Prenatal Multivitamins with Folic Acid 1 mg oral tablet  -- 1 tab(s) by mouth once a day  -- Indication: For Healthy mother

## 2023-05-30 NOTE — DISCHARGE NOTE OB - NS MD DC FALL RISK RISK
For information on Fall & Injury Prevention, visit: [email or web address] OR  [email or web address] OR  https://www.cdc.gov/steadi/patient.html

## 2023-05-30 NOTE — DISCHARGE NOTE OB - CARE PROVIDER_API CALL
Eve Lowe  Obstetrics and Gynecology  [address] [place] [address]  Phone: [phone]  Fax: [phone]  Follow Up Time:

## 2023-05-30 NOTE — DISCHARGE NOTE OB - CARE PLAN
1 Principal Discharge DX:	Vaginal birth after   Assessment and plan of treatment:	No heavy lifting x4 weeks. Nothing in the vagina for 6 weeks - no sex, tampons, douching, tub baths or pools. May Shower. If you have a fever over 100.4F, severe pain or severe bleeding, please call your doctor or visit the emergency room. Follow up in 6 weeks for postpartum check with your doctor.

## 2023-05-30 NOTE — DISCHARGE NOTE OB - PATIENT PORTAL LINK FT
You can access the FollowMyHealth Patient Portal offered by [facility] by registering at the following website: [email or web address]. By joining [facility]’s FollowMyHealth portal, you will also be able to view your health information using other applications (apps) compatible with our system.

## 2023-06-05 DIAGNOSIS — Z28.09 IMMUNIZATION NOT CARRIED OUT BECAUSE OF OTHER CONTRAINDICATION: ICD-10-CM

## 2023-06-05 DIAGNOSIS — Z3A.39 39 WEEKS GESTATION OF PREGNANCY: ICD-10-CM

## 2023-06-05 DIAGNOSIS — O34.211 MATERNAL CARE FOR LOW TRANSVERSE SCAR FROM PREVIOUS CESAREAN DELIVERY: ICD-10-CM

## 2023-06-05 DIAGNOSIS — Z86.718 PERSONAL HISTORY OF OTHER VENOUS THROMBOSIS AND EMBOLISM: ICD-10-CM

## 2023-06-05 DIAGNOSIS — Z28.310 UNVACCINATED FOR COVID-19: ICD-10-CM

## 2024-02-08 PROBLEM — I82.409 ACUTE EMBOLISM AND THROMBOSIS OF UNSPECIFIED DEEP VEINS OF UNSPECIFIED LOWER EXTREMITY: Chronic | Status: ACTIVE | Noted: 2023-05-28

## 2024-08-19 PROBLEM — Z00.00 ENCOUNTER FOR PREVENTIVE HEALTH EXAMINATION: Status: ACTIVE | Noted: 2024-08-19

## 2024-08-20 ENCOUNTER — APPOINTMENT (OUTPATIENT)
Dept: ENDOCRINOLOGY | Facility: CLINIC | Age: 34
End: 2024-08-20
Payer: COMMERCIAL

## 2024-08-20 VITALS
HEART RATE: 86 BPM | DIASTOLIC BLOOD PRESSURE: 70 MMHG | HEIGHT: 65 IN | WEIGHT: 192 LBS | OXYGEN SATURATION: 98 % | BODY MASS INDEX: 31.99 KG/M2 | SYSTOLIC BLOOD PRESSURE: 120 MMHG

## 2024-08-20 DIAGNOSIS — Z78.9 OTHER SPECIFIED HEALTH STATUS: ICD-10-CM

## 2024-08-20 DIAGNOSIS — R63.5 ABNORMAL WEIGHT GAIN: ICD-10-CM

## 2024-08-20 DIAGNOSIS — E66.9 OBESITY, UNSPECIFIED: ICD-10-CM

## 2024-08-20 DIAGNOSIS — F41.9 ANXIETY DISORDER, UNSPECIFIED: ICD-10-CM

## 2024-08-20 PROCEDURE — 99203 OFFICE O/P NEW LOW 30 MIN: CPT

## 2024-08-20 RX ORDER — SEMAGLUTIDE 0.5 MG/.5ML
0.5 INJECTION, SOLUTION SUBCUTANEOUS
Qty: 1 | Refills: 5 | Status: ACTIVE | COMMUNITY
Start: 2024-08-20 | End: 1900-01-01

## 2024-08-20 RX ORDER — SEMAGLUTIDE 0.25 MG/.5ML
0.25 INJECTION, SOLUTION SUBCUTANEOUS
Qty: 1 | Refills: 0 | Status: ACTIVE | COMMUNITY
Start: 2024-08-20 | End: 1900-01-01

## 2024-08-20 NOTE — REVIEW OF SYSTEMS
[Recent Weight Gain (___ Lbs)] : recent weight gain: [unfilled] lbs [Dysphagia] : no dysphagia [Neck Pain] : no neck pain [Dysphonia] : no dysphonia [Chest Pain] : no chest pain [Slow Heart Rate] : heart rate is not slow [Palpitations] : no palpitations [Fast Heart Rate] : heart rate is not fast [As Noted in HPI] : as noted in HPI

## 2024-08-22 NOTE — ASSESSMENT
[FreeTextEntry1] : 34 year old lady who present for evaluation of weight gain , inability to lose weight   #Obesity BMI 31 - s/p 2 pregnancies , son is 3 years breastfeed , she stopped breastfeeding 1 months ago , has IUD - had one blood clots - periods regular, HAs IUD - + weight gain since second pregnancy unable to lose despite diet , exercise , doing Pilates. - no PCOS , no hirsutism and acne , no recent steroid use  will try wegovy 0.25 mg an increase if tolerated , discussed possible SE , has IUD and is not breastfeeding

## 2024-08-22 NOTE — HISTORY OF PRESENT ILLNESS
[FreeTextEntry1] : 34 year old lady who present for evaluation of weight gain , inability to lose weight    #Obesity BMI 31  -  s/p 2 pregnancies , son is 3 years breastfeed , she stopped breastfeeding 1 months ago , has IUD  - had one blood clots  - periods regular, HAs IUD  - + weight gain since second pregnancy unable to lose despite diet , exercise , doing Pilates.  - no PCOS , no hirsutism and acne , no recent steroid use

## 2024-08-22 NOTE — PHYSICAL EXAM
[Alert] : alert [Obese] : obese [No Acute Distress] : no acute distress [No Proptosis] : no proptosis [No Lid Lag] : no lid lag [Thyroid Not Enlarged] : the thyroid was not enlarged [No Respiratory Distress] : no respiratory distress [No Accessory Muscle Use] : no accessory muscle use [Clear to Auscultation] : lungs were clear to auscultation bilaterally [Normal S1, S2] : normal S1 and S2 [No Murmurs] : no murmurs [Regular Rhythm] : with a regular rhythm [No Edema] : no peripheral edema [No Stigmata of Cushings Syndrome] : no stigmata of Cushings Syndrome [No Tremors] : no tremors [Oriented x3] : oriented to person, place, and time [Soft] : abdomen soft [Acanthosis Nigricans] : no acanthosis nigricans [Acne] : no acne [Hirsutism] : no hirsutism

## 2024-09-16 ENCOUNTER — RX RENEWAL (OUTPATIENT)
Age: 34
End: 2024-09-16

## 2025-01-14 ENCOUNTER — APPOINTMENT (OUTPATIENT)
Dept: ENDOCRINOLOGY | Facility: CLINIC | Age: 35
End: 2025-01-14
Payer: COMMERCIAL

## 2025-01-14 VITALS
OXYGEN SATURATION: 99 % | HEIGHT: 65 IN | WEIGHT: 186 LBS | BODY MASS INDEX: 30.99 KG/M2 | TEMPERATURE: 97.4 F | SYSTOLIC BLOOD PRESSURE: 102 MMHG | HEART RATE: 103 BPM | DIASTOLIC BLOOD PRESSURE: 60 MMHG

## 2025-01-14 DIAGNOSIS — R63.5 ABNORMAL WEIGHT GAIN: ICD-10-CM

## 2025-01-14 DIAGNOSIS — E66.9 OBESITY, UNSPECIFIED: ICD-10-CM

## 2025-01-14 PROCEDURE — 99213 OFFICE O/P EST LOW 20 MIN: CPT

## 2025-01-14 RX ORDER — SEMAGLUTIDE 1 MG/.5ML
1 INJECTION, SOLUTION SUBCUTANEOUS
Qty: 1 | Refills: 5 | Status: ACTIVE | COMMUNITY
Start: 2025-01-14 | End: 1900-01-01

## 2025-02-18 ENCOUNTER — APPOINTMENT (OUTPATIENT)
Dept: ENDOCRINOLOGY | Facility: CLINIC | Age: 35
End: 2025-02-18

## 2025-05-01 ENCOUNTER — RESULT REVIEW (OUTPATIENT)
Age: 35
End: 2025-05-01

## 2025-05-01 ENCOUNTER — OUTPATIENT (OUTPATIENT)
Dept: OUTPATIENT SERVICES | Facility: HOSPITAL | Age: 35
LOS: 1 days | End: 2025-05-01
Payer: COMMERCIAL

## 2025-05-01 DIAGNOSIS — Z98.891 HISTORY OF UTERINE SCAR FROM PREVIOUS SURGERY: Chronic | ICD-10-CM

## 2025-05-01 DIAGNOSIS — N63.20 UNSPECIFIED LUMP IN THE LEFT BREAST, UNSPECIFIED QUADRANT: ICD-10-CM

## 2025-05-01 PROCEDURE — G0279: CPT

## 2025-05-01 PROCEDURE — 77066 DX MAMMO INCL CAD BI: CPT | Mod: 26

## 2025-05-01 PROCEDURE — 76641 ULTRASOUND BREAST COMPLETE: CPT | Mod: 26,50

## 2025-05-01 PROCEDURE — 77066 DX MAMMO INCL CAD BI: CPT

## 2025-05-01 PROCEDURE — 76641 ULTRASOUND BREAST COMPLETE: CPT | Mod: 50

## 2025-05-01 PROCEDURE — G0279: CPT | Mod: 26

## 2025-05-02 DIAGNOSIS — N63.20 UNSPECIFIED LUMP IN THE LEFT BREAST, UNSPECIFIED QUADRANT: ICD-10-CM

## 2025-07-17 ENCOUNTER — APPOINTMENT (OUTPATIENT)
Dept: ENDOCRINOLOGY | Facility: CLINIC | Age: 35
End: 2025-07-17

## 2025-07-26 NOTE — OB RN DELIVERY SUMMARY - BABY A: APGAR 1 MIN MUSCLE TONE, DELIVERY
Problem: Postoperative Care  Goal: Vital signs are maintained within parameters  Outcome: Monitoring/Evaluating progress  Goal: Elimination status is maintained/returned to baseline  Outcome: Monitoring/Evaluating progress  Goal: Oral intake is resumed and tolerated  Outcome: Monitoring/Evaluating progress  Goal: Activity level is resumed to level needed for d/c  Outcome: Monitoring/Evaluating progress  Goal: Verbalizes understanding of postoperative care in the hospital and after d/c  Description: Document on Patient Education Activity  Outcome: Monitoring/Evaluating progress     Problem: At Risk for Falls  Goal: Patient does not fall  Outcome: Monitoring/Evaluating progress  Goal: Patient takes action to control fall-related risks  Outcome: Monitoring/Evaluating progress     Problem: At Risk for Injury Due to Fall  Goal: Patient does not fall  Outcome: Monitoring/Evaluating progress  Goal: Takes action to control condition specific risks  Outcome: Monitoring/Evaluating progress  Goal: Verbalizes understanding of fall-related injury personal risks  Description: Document education using the patient education activity  Outcome: Monitoring/Evaluating progress     Problem: Pain  Goal: Acceptable pain level achieved/maintained at rest using appropriate pain scale for the patient  Outcome: Monitoring/Evaluating progress  Goal: Acceptable pain level achieved/maintained with activity using appropriate pain scale for the patient  Outcome: Monitoring/Evaluating progress  Goal: Acceptable pain level achieved/maintained without oversedation  Outcome: Monitoring/Evaluating progress     Problem: Impaired Physical Mobility  Goal: Functional status is maintained or returned to baseline during hospitalization  Outcome: Monitoring/Evaluating progress  Goal: Tolerates activity for discharge setting with no abnormal symptoms  Outcome: Monitoring/Evaluating progress      (2) well flexed